# Patient Record
Sex: MALE | Race: WHITE | NOT HISPANIC OR LATINO | Employment: FULL TIME | URBAN - METROPOLITAN AREA
[De-identification: names, ages, dates, MRNs, and addresses within clinical notes are randomized per-mention and may not be internally consistent; named-entity substitution may affect disease eponyms.]

---

## 2020-09-23 ENCOUNTER — HOSPITAL ENCOUNTER (EMERGENCY)
Facility: OTHER | Age: 40
Discharge: HOME | End: 2020-09-23
Attending: EMERGENCY MEDICINE
Payer: COMMERCIAL

## 2020-09-23 ENCOUNTER — ANESTHESIA (OUTPATIENT)
Dept: ENDOSCOPY | Facility: OTHER | Age: 40
End: 2020-09-23

## 2020-09-23 ENCOUNTER — ANESTHESIA EVENT (OUTPATIENT)
Dept: ENDOSCOPY | Facility: OTHER | Age: 40
End: 2020-09-23

## 2020-09-23 VITALS
DIASTOLIC BLOOD PRESSURE: 59 MMHG | OXYGEN SATURATION: 100 % | SYSTOLIC BLOOD PRESSURE: 103 MMHG | RESPIRATION RATE: 18 BRPM | HEIGHT: 72 IN | BODY MASS INDEX: 31.83 KG/M2 | TEMPERATURE: 98 F | WEIGHT: 235 LBS | HEART RATE: 70 BPM

## 2020-09-23 DIAGNOSIS — T18.108A FOREIGN BODY IN ESOPHAGUS, INITIAL ENCOUNTER: Primary | ICD-10-CM

## 2020-09-23 DIAGNOSIS — K20.90 ESOPHAGITIS: ICD-10-CM

## 2020-09-23 LAB
CTP QC/QA: YES
SARS-COV-2 RDRP RESP QL NAA+PROBE: NEGATIVE

## 2020-09-23 PROCEDURE — 43239 EGD BIOPSY SINGLE/MULTIPLE: CPT | Performed by: INTERNAL MEDICINE

## 2020-09-23 PROCEDURE — 88305 TISSUE EXAM BY PATHOLOGIST: ICD-10-PCS | Mod: 26,,, | Performed by: PATHOLOGY

## 2020-09-23 PROCEDURE — 63600175 PHARM REV CODE 636 W HCPCS: Performed by: NURSE ANESTHETIST, CERTIFIED REGISTERED

## 2020-09-23 PROCEDURE — 88305 TISSUE EXAM BY PATHOLOGIST: CPT | Mod: 26,,, | Performed by: PATHOLOGY

## 2020-09-23 PROCEDURE — 37000008 HC ANESTHESIA 1ST 15 MINUTES: Performed by: INTERNAL MEDICINE

## 2020-09-23 PROCEDURE — 43247 EGD REMOVE FOREIGN BODY: CPT | Performed by: INTERNAL MEDICINE

## 2020-09-23 PROCEDURE — 37000009 HC ANESTHESIA EA ADD 15 MINS: Performed by: INTERNAL MEDICINE

## 2020-09-23 PROCEDURE — U0002 COVID-19 LAB TEST NON-CDC: HCPCS | Performed by: EMERGENCY MEDICINE

## 2020-09-23 PROCEDURE — 88305 TISSUE EXAM BY PATHOLOGIST: CPT | Performed by: PATHOLOGY

## 2020-09-23 PROCEDURE — 25000003 PHARM REV CODE 250: Performed by: NURSE ANESTHETIST, CERTIFIED REGISTERED

## 2020-09-23 PROCEDURE — 99291 CRITICAL CARE FIRST HOUR: CPT

## 2020-09-23 RX ORDER — ONDANSETRON 2 MG/ML
INJECTION INTRAMUSCULAR; INTRAVENOUS
Status: DISCONTINUED | OUTPATIENT
Start: 2020-09-23 | End: 2020-09-23

## 2020-09-23 RX ORDER — LIDOCAINE HCL/PF 100 MG/5ML
SYRINGE (ML) INTRAVENOUS
Status: DISCONTINUED | OUTPATIENT
Start: 2020-09-23 | End: 2020-09-23

## 2020-09-23 RX ORDER — PROPOFOL 10 MG/ML
VIAL (ML) INTRAVENOUS
Status: DISCONTINUED | OUTPATIENT
Start: 2020-09-23 | End: 2020-09-23

## 2020-09-23 RX ORDER — HYDROMORPHONE HYDROCHLORIDE 2 MG/ML
0.4 INJECTION, SOLUTION INTRAMUSCULAR; INTRAVENOUS; SUBCUTANEOUS EVERY 5 MIN PRN
Status: DISCONTINUED | OUTPATIENT
Start: 2020-09-23 | End: 2020-09-23 | Stop reason: HOSPADM

## 2020-09-23 RX ORDER — MEPERIDINE HYDROCHLORIDE 25 MG/ML
12.5 INJECTION INTRAMUSCULAR; INTRAVENOUS; SUBCUTANEOUS ONCE AS NEEDED
Status: DISCONTINUED | OUTPATIENT
Start: 2020-09-23 | End: 2020-09-23 | Stop reason: HOSPADM

## 2020-09-23 RX ORDER — SODIUM CHLORIDE 0.9 % (FLUSH) 0.9 %
3 SYRINGE (ML) INJECTION
Status: DISCONTINUED | OUTPATIENT
Start: 2020-09-23 | End: 2020-09-23 | Stop reason: HOSPADM

## 2020-09-23 RX ORDER — NEOSTIGMINE METHYLSULFATE 1 MG/ML
INJECTION, SOLUTION INTRAVENOUS
Status: DISCONTINUED | OUTPATIENT
Start: 2020-09-23 | End: 2020-09-23

## 2020-09-23 RX ORDER — PANTOPRAZOLE SODIUM 40 MG/1
40 TABLET, DELAYED RELEASE ORAL 2 TIMES DAILY
Qty: 60 TABLET | Refills: 0 | Status: SHIPPED | OUTPATIENT
Start: 2020-09-23 | End: 2021-09-23

## 2020-09-23 RX ORDER — ROCURONIUM BROMIDE 10 MG/ML
INJECTION, SOLUTION INTRAVENOUS
Status: DISCONTINUED | OUTPATIENT
Start: 2020-09-23 | End: 2020-09-23

## 2020-09-23 RX ORDER — FENTANYL CITRATE 50 UG/ML
INJECTION, SOLUTION INTRAMUSCULAR; INTRAVENOUS
Status: DISCONTINUED | OUTPATIENT
Start: 2020-09-23 | End: 2020-09-23

## 2020-09-23 RX ORDER — ONDANSETRON 2 MG/ML
4 INJECTION INTRAMUSCULAR; INTRAVENOUS DAILY PRN
Status: DISCONTINUED | OUTPATIENT
Start: 2020-09-23 | End: 2020-09-23 | Stop reason: HOSPADM

## 2020-09-23 RX ORDER — SODIUM CHLORIDE, SODIUM LACTATE, POTASSIUM CHLORIDE, CALCIUM CHLORIDE 600; 310; 30; 20 MG/100ML; MG/100ML; MG/100ML; MG/100ML
INJECTION, SOLUTION INTRAVENOUS CONTINUOUS PRN
Status: DISCONTINUED | OUTPATIENT
Start: 2020-09-23 | End: 2020-09-23

## 2020-09-23 RX ORDER — SUCCINYLCHOLINE CHLORIDE 20 MG/ML
INJECTION INTRAMUSCULAR; INTRAVENOUS
Status: DISCONTINUED | OUTPATIENT
Start: 2020-09-23 | End: 2020-09-23

## 2020-09-23 RX ORDER — OXYCODONE HYDROCHLORIDE 5 MG/1
5 TABLET ORAL
Status: DISCONTINUED | OUTPATIENT
Start: 2020-09-23 | End: 2020-09-23 | Stop reason: HOSPADM

## 2020-09-23 RX ADMIN — NEOSTIGMINE METHYLSULFATE 5 MG: 1 INJECTION INTRAVENOUS at 06:09

## 2020-09-23 RX ADMIN — SUCCINYLCHOLINE CHLORIDE 160 MG: 20 INJECTION, SOLUTION INTRAMUSCULAR; INTRAVENOUS at 06:09

## 2020-09-23 RX ADMIN — FENTANYL CITRATE 100 MCG: 50 INJECTION, SOLUTION INTRAMUSCULAR; INTRAVENOUS at 06:09

## 2020-09-23 RX ADMIN — ONDANSETRON HYDROCHLORIDE 4 MG: 2 INJECTION INTRAMUSCULAR; INTRAVENOUS at 06:09

## 2020-09-23 RX ADMIN — GLYCOPYRROLATE 0.6 MG: 0.2 INJECTION, SOLUTION INTRAMUSCULAR; INTRAVITREAL at 06:09

## 2020-09-23 RX ADMIN — LIDOCAINE HYDROCHLORIDE 100 MG: 20 INJECTION, SOLUTION INTRAVENOUS at 06:09

## 2020-09-23 RX ADMIN — SODIUM CHLORIDE, SODIUM LACTATE, POTASSIUM CHLORIDE, AND CALCIUM CHLORIDE: 600; 310; 30; 20 INJECTION, SOLUTION INTRAVENOUS at 06:09

## 2020-09-23 RX ADMIN — PROPOFOL 200 MG: 10 INJECTION, EMULSION INTRAVENOUS at 06:09

## 2020-09-23 RX ADMIN — CARBOXYMETHYLCELLULOSE SODIUM 2 DROP: 2.5 SOLUTION/ DROPS OPHTHALMIC at 06:09

## 2020-09-23 RX ADMIN — ROCURONIUM BROMIDE 20 MG: 10 INJECTION, SOLUTION INTRAVENOUS at 06:09

## 2020-09-23 NOTE — Clinical Note
"Vince ROE" Homero was seen and treated in our emergency department on 9/23/2020.  He may return to work on 09/25/2020.       If you have any questions or concerns, please don't hesitate to call.      Alton Solis RN    "

## 2020-09-23 NOTE — DISCHARGE INSTRUCTIONS
Call your primary care doctor to make the first available appointment.     Keep all your medical appointments.     Take your regular medication as prescribed. Contact your primary care provider before running out of medication, or for any problems obtaining them.    Do not drive or operate heavy machinery while taking opioid or muscle relaxing medications. Examples include norco, percocet, xanax, valium, flexeril.     Overuse or long term use of pain and sedating medication may lead to addiction, dependence, organ failure, family and work problems, legal problems, accidental overdose and death.    If you do not have health insurance, you probably can afford it:  Call 1-531.372.1753 ([facility] hotline) or go to www.[place].gov    Your evaluation in the ED does not suggest any emergent or life threatening medical condition requiring admission or immediate intervention beyond that provided in the ED.   However, the signs of a serious problem sometimes take more time to appear.   RETURN TO THE ER if any of the following occur:    Weakness, dizziness, fainting, or loss of consciousness   Fever of 100.4ºF (38ºC) or higher  Any worse symptoms  Any new or concerning symptoms    To protect yourself and others from COVID19:  Minimize trips outside of home.  Wear a mask whenever outside your home.   Wear a mask whenever indoors with people you do not live with.  Stay at least 6 feet from others you do not live with (inside or outside).  Wash hands frequently for at least 20 seconds at a time.  Avoid crowds or crowded places.  Quarantine for 14 days if you are exposed to someone with cold, flu, or COVID19 symptoms.   Even if you or they had a negative COVID19 test   Even if you have no symptoms   Quarantine for 14 days if you have cold, flu, or COVID19 symptoms (fever, cough, sore throat, breathing troubles, loss of taste/smell, stomach symptoms, etc.)  Disinfect surfaces that are touched a lot (includes your phone, handles,  switches, etc)      Free COVID19 testing:   No cost. No health insurance required. Walk-up.   [facility] Mobile Testing: Anyone is eligible. No ID required. Most days. Hours and locations vary. Call 211 or go to ready.[place].gov.   [facility]: Must have symptoms (fever, cough, sore throat, breathing troubles, loss of taste/smell, stomach symptoms, etc.). [address], [phone], [email or web address]. Mon-Fri 830am-4pm. Sat 830am-11am.     Food Assistance:  [place] COVID-19 Meal Assistance Program: The [facility] will provide meal assistance to any resident 65 or older, adults with high-risk conditions, homeless residents, people who test positive for COVID-19, people recently exposed to COVID19, and children under 18. Call 311 (490-463-8708) or go to [place].gov/311    [employer]:   Why Hunger Hotline (LEIGH/EN): text your zip code to receive a list of food pantries near you: [phone]  [employer] [place]: Saturdays 9am-12pm at [address].  No ID or paperwork required.  [employer] [place]: Locations vary weekly. Call 211 for information about second harvest or visit www.[place].gov. No ID or paperwork required. Your windows do not need to be opened. [employer] may be present.     Food Delivery:  [employer] (EN/LEIGH):  Delivering on Wednesdays.  Call 246-832-0097 or text 094-057-1950  [employer]: (LEIGH/EN): Delivering groceries and supplies to immigrant families Monday through Friday 9am-4pm. [phone].  [place] [employer] (EN): Delivering groceries for those 60+ and/or immunocompromised. 535.769.6308 or yamutualaid@[employer].Metrilus    Legal Services and Rent Assistance:  [facility] Legal Service: Free legal services for low income individuals. Includes problems with rent or eviction. 405.379.9932  [facility] [employer]: For problems with rent, eviction. Call/text 650-330-1347, or email chanel@jpnsi.org  [facility]  Justice Center: Guidance for those experiencing family violence, child abuse, and sexual assault. 119.354.3250    Unemployment:  Those who have lost all or some of their work are eligible for state unemployment ($247 weekly before tax). They may also be eligible for federal Pandemic Unemployment Assistance ($600 weekly before tax). This includes independent contractors, gig workers, and those unemployed pre-COVID.   [place] Unemployment Hotline: Mon-Fri 830am-430pm. 287.416.6004, 761.323.7379, and 672-025-9642. Due to long phone hold times, we recommend applying online at www.[employer]EX[employer]. If you need help with internet access for the application, call [phone].

## 2020-09-23 NOTE — ANESTHESIA POSTPROCEDURE EVALUATION
Anesthesia Post Evaluation    Patient: Vince Valentin    Procedure(s) Performed: Procedure(s) (LRB):  EGD (ESOPHAGOGASTRODUODENOSCOPY) (N/A)    Final Anesthesia Type: general    Patient location during evaluation: PACU  Patient participation: Yes- Able to Participate  Level of consciousness: awake and alert  Post-procedure vital signs: reviewed and stable  Pain management: adequate  Airway patency: patent    PONV status at discharge: No PONV  Anesthetic complications: no      Cardiovascular status: blood pressure returned to baseline  Respiratory status: unassisted and room air  Hydration status: euvolemic  Follow-up not needed.          Vitals Value Taken Time   /75 09/23/20 0812   Temp 36.3 °C (97.4 °F) 09/23/20 0730   Pulse 73 09/23/20 0826   Resp 18 09/23/20 0730   SpO2 93 % 09/23/20 0826   Vitals shown include unvalidated device data.      Event Time   Out of Recovery 07:46:00         Pain/Alissa Score: Alissa Score: 10 (9/23/2020  7:30 AM)

## 2020-09-23 NOTE — ED NOTES
Pt returned from endo. Pt AAOx4. Pt speaking in clear, complete sentences. Pt able to hold an upright position. Pt has no difficulty swallowing. Pt able to swallow water without any difficulty. Pt denies CP, SOB, n/v/d, fever, chills. RR even, unlabored. NAD noted. Pt attached to pulse ox and BP cycled. Will continue to monitor.

## 2020-09-23 NOTE — PROVATION PATIENT INSTRUCTIONS
Discharge Summary/Instructions after an Endoscopic Procedure  Patient Name: Vince Valentin  Patient MRN: [id number]  Patient YOB: 1980 Wednesday, September 23, 2020  Meghna Gore MD  RESTRICTIONS:  During your procedure today, you received medications for sedation.  These   medications may affect your judgment, balance and coordination.  Therefore,   for 24 hours, you have the following restrictions:   - DO NOT drive a car, operate machinery, make legal/financial decisions,   sign important papers or drink alcohol.    ACTIVITY:  Today: no heavy lifting, straining or running due to procedural   sedation/anesthesia.  The following day: return to full activity including work.  DIET:  Eat and drink normally unless instructed otherwise.     TREATMENT FOR COMMON SIDE EFFECTS:  - Mild abdominal pain, nausea, belching, bloating or excessive gas:  rest,   eat lightly and use a heating pad.  - Sore Throat: treat with throat lozenges and/or gargle with warm salt   water.  - Because air was used during the procedure, expelling large amounts of air   from your rectum or belching is normal.  - If a bowel prep was taken, you may not have a bowel movement for 1-3 days.    This is normal.  SYMPTOMS TO WATCH FOR AND REPORT TO YOUR PHYSICIAN:  1. Abdominal pain or bloating, other than gas cramps.  2. Chest pain.  3. Back pain.  4. Signs of infection such as: chills or fever occurring within 24 hours   after the procedure.  5. Rectal bleeding, which would show as bright red, maroon, or black stools.   (A tablespoon of blood from the rectum is not serious, especially if   hemorrhoids are present.)  6. Vomiting.  7. Weakness or dizziness.  GO DIRECTLY TO THE NEAREST EMERGENCY ROOM IF YOU HAVE ANY OF THE FOLLOWING:      Difficulty breathing              Chills and/or fever over 101 F   Persistent vomiting and/or vomiting blood   Severe abdominal pain   Severe chest pain   Black, tarry stools   Bleeding- more than one  tablespoon   Any other symptom or condition that you feel may need urgent attention  Your doctor recommends these additional instructions:  If any biopsies were taken, your doctors clinic will contact you in 1 to 2   weeks with any results.  - Patient has a contact number available for emergencies.  The signs and   symptoms of potential delayed complications were discussed with the   patient.  Return to normal activities tomorrow.  Written discharge   instructions were provided to the patient.   - Soft diet for 10 days.   - Post procedure medication orders were given.   - Await pathology results.   - Use Protonix (pantoprazole) 40 mg PO BID.   - Discharge patient to home.  For questions, problems or results please call your physician - Meghna Gore MD at Work:  [phone].  [facility], EMERGENCY ROOM PHONE NUMBER: [phone], [facility]   [phone].  IF A COMPLICATION OR EMERGENCY SITUATION ARISES AND YOU ARE UNABLE TO REACH   YOUR PHYSICIAN - GO DIRECTLY TO THE EMERGENCY ROOM.  MD Meghna Bhat MD  9/23/2020 7:00:37 AM  This report has been verified and signed electronically.  [employer]ATION

## 2020-09-23 NOTE — CONSULTS
Reason for Consult:  Food bolus impaction    HPI:   Pt is a 40 y.o. male  with a h/o peptic stricture that was dilated several years ago. He didn't take PPI as instructed. He reports intermittent dysphagia that sometimes requires regurgitation. This is his first food bolus. No anticoagulation. No excessive ETOH    No past medical history on file.    No past surgical history on file.    No family history on file.    Social History     Socioeconomic History    Marital status: Single     Spouse name: Not on file    Number of children: Not on file    Years of education: Not on file    Highest education level: Not on file   Occupational History    Not on file   Social Needs    Financial resource strain: Not on file    Food insecurity     Worry: Not on file     Inability: Not on file    Transportation needs     Medical: Not on file     Non-medical: Not on file   Tobacco Use    Smoking status: Not on file   Substance and Sexual Activity    Alcohol use: Not on file    Drug use: Not on file    Sexual activity: Not on file   Lifestyle    Physical activity     Days per week: Not on file     Minutes per session: Not on file    Stress: Not on file   Relationships    Social connections     Talks on phone: Not on file     Gets together: Not on file     Attends Mandaeism service: Not on file     Active member of club or organization: Not on file     Attends meetings of clubs or organizations: Not on file     Relationship status: Not on file   Other Topics Concern    Not on file   Social History Narrative    Not on file       Endoscopic History:  As above    Review of patient's allergies indicates:  No Known Allergies    No current facility-administered medications on file prior to encounter.      No current outpatient medications on file prior to encounter.     Scheduled Meds:  Continuous Infusions:  PRN Meds:.    Review of Systems:  CONSTITUTIONAL: Negative for weakness, fever, weight loss, weight gain.  HEENT:  Eyes: Negative for double/blurred vision. Ears: Negative pain or loss of hearing. Nose:Negative for nasal congestion. Negative for rhinorrhea Mouth: Negative for dry mouth/pain Throat: Negative for masses or hoarseness.  CARDIOVASCULAR: Negative for chest pain or palpitations.  RESPIRATORY: Negative for SOB, wheezes  GASTROINTESTINAL: See HPI  GENITOURINARY: Negative for dysuria/hematuria.  MUSCULOSKELETAL: Negative for osteoarthritis, muscle pain.  SKIN: Negative for rashes/lesions.  NEUROLOGIC: Negative for headaches, numbness/tingling.  ENDOCRINE: Negative for diabetes or thyroid abnormalities.  HEMATOLOGIC: Negative for anemia or blood dyscrasias.    Patient Vitals for the past 24 hrs:   BP Temp Pulse Resp SpO2 Height Weight   09/23/20 0518 (!) 147/86 -- 74 -- 100 % -- --   09/23/20 0515 -- 97.9 °F (36.6 °C) -- -- -- -- --   09/23/20 0454 119/88 -- 82 18 98 % 6' (1.829 m) 106.6 kg (235 lb)       Gen: Well developed, well nourished,mild distress, can't handle secretions,cooperative  HEENT: Anicteric, PERRLA, normocephalic, neck symmetrical  CV: S1, S2, no murmers/rubs, non-displaced PMI  Lungs: CTA-B, normal excursion  Abd: Soft, NT, ND, normal BS's, no HSM  Ext: No c/c/e, 1+ DP pulses to BLE's  Neuro: CN II-XII grossly intact, no asterixis.  Skin: No rashes/lesions, normal texture  Psych: AA&O x 4, normal affect    Labs:  No results for input(s): GLUCOSE, CALCIUM, PROT, NA, K, CO2, CL, BUN, CREATININE, ALKPHOS, ALT, AST, BILITOT in the last 24 hours.    Invalid input(s):  ALBUMIN  No results found for this or any previous visit (from the past 336 hour(s)).  No results for input(s): PT, INR, APTT in the last 24 hours.      Assessment:  Pt. Is a 40 y.o. male with food bolus impaction    Recommendations:  Emergent EGD    I would like to take this opportunity to thank you for this consult.  If you have any questions or concerns, please do not hesitate to contact me.

## 2020-09-23 NOTE — TRANSFER OF CARE
Anesthesia Transfer of Care Note    Patient: Vince Valentin    Procedure(s) Performed: Procedure(s) (LRB):  EGD (ESOPHAGOGASTRODUODENOSCOPY) (N/A)    Patient location: PACU    Anesthesia Type: epidural    Transport from OR: Transported from OR on 2-3 L/min O2 by NC with adequate spontaneous ventilation    Post pain: adequate analgesia    Post assessment: no apparent anesthetic complications    Post vital signs: stable    Level of consciousness: awake    Complications: none    Transfer of care protocol was followed      Last vitals:   Visit Vitals  BP (!) 146/84 (BP Location: Right arm, Patient Position: Lying)   Pulse 77   Temp 36.2 °C (97.2 °F) (Skin)   Resp 18   Ht 6' (1.829 m)   Wt 106.6 kg (235 lb)   SpO2 98%   BMI 31.87 kg/m²

## 2020-09-23 NOTE — ANESTHESIA PREPROCEDURE EVALUATION
09/23/2020  Vince Valentin is a 40 y.o., male.    Anesthesia Evaluation    I have reviewed the Patient Summary Reports.    I have reviewed the Nursing Notes. I have reviewed the NPO Status.      Review of Systems  Anesthesia Hx:  No problems with previous Anesthesia    Social:  Smoker    Cardiovascular:   Exercise tolerance: good    Pulmonary:  Pulmonary Normal    Hepatic/GI:  Hepatic/GI Normal    Endocrine:  Endocrine Normal        Physical Exam  General:  Well nourished    Airway/Jaw/Neck:  Airway Findings: Mouth Opening: Normal Tongue: Normal  General Airway Assessment: Adult  Mallampati: II  TM Distance: Normal, at least 6 cm  Jaw/Neck Findings:     Neck ROM: Normal ROM      Dental:  Dental Findings: In tact             Anesthesia Plan  Type of Anesthesia, risks & benefits discussed:  Anesthesia Type:  general  Patient's Preference:   Intra-op Monitoring Plan:   Intra-op Monitoring Plan Comments:   Post Op Pain Control Plan:   Post Op Pain Control Plan Comments:   Induction:   IV  Beta Blocker:         Informed Consent: Patient understands risks and agrees with Anesthesia plan.  Questions answered. Anesthesia consent signed with patient.  ASA Score: 2  emergent   Day of Surgery Review of History & Physical:    H&P update referred to the surgeon.         Ready For Surgery From Anesthesia Perspective.

## 2020-09-23 NOTE — PROVIDER PROGRESS NOTES - EMERGENCY DEPT.
Encounter Date: 9/23/2020    ED Physician Progress Notes        Physician Note:   40-year-old male transferred here for evaluation of esophageal food bolus, unable to tolerate liquids or his secretions.  He has history of esophageal stricture in the past.  Patient signed out by Dr. Henderson pending EGD and disposition.  GI took patient to for EGD this morning and removed food bolus, but found no stricture.  It did show some esophagitis that was biopsied, which may be responsible patient's symptoms of slow transit in his distal esophagus.  Since return from EGD, patient now back to normal mental status baseline and tolerating liquids without difficulty. He is already on Protonix 40 mg daily, and was advised to increase it to b.i.d., and I also counseled him on diet modification for esophagitis.  He is having someone come pick him up, and was advised on post sedation precautions and to slowly advance diet and avoid foods with large pieces.  He is comfortable discharge plan and will follow-up with GI as outpatient.

## 2020-09-23 NOTE — PLAN OF CARE
Please see Provation for details  Food bolus at GE junction removed with Tracy net and rat tooth forceps. No stricture seen, but esophagitis in distal esophagus and features consistent with Eosinophilic esophagitis. Proximal esophageal bx taken.  Rec:  PPI bid  Soft diet  Call in a week for bx results

## 2020-09-23 NOTE — ANESTHESIA PROCEDURE NOTES
Intubation  Performed by: Poly Post CRNA  Authorized by: Dustin Russ MD     Intubation:     Induction:  Rapid sequence induction    Intubated:  Postinduction    Mask Ventilation:  N/a    Attempts:  1    Attempted By:  CRNA    Method of Intubation:  Video laryngoscopy    Blade:  Zapien 3    Laryngeal View Grade: Grade I - full view of chords      Difficult Airway Encountered?: No      Complications:  None    Airway Device:  Oral endotracheal tube    Airway Device Size:  7.5    Style/Cuff Inflation:  Cuffed    Inflation Amount (mL):  4    Tube secured:  23    Secured at:  The lips    Placement Verified By:  Capnometry    Complicating Factors:  None    Findings Post-Intubation:  BS equal bilateral

## 2020-09-23 NOTE — ED PROVIDER NOTES
Encounter Date: 9/23/2020       History     Chief Complaint   Patient presents with    Swallowed Foreign Body     transfer for esophageal stricture     HPI   History provided by the patient, medical records, EMS.  Patient presents with sensation of impacted food bolus at his epigastrium, and inability to tolerate any oral intake including his saliva since approximately 1630 hr when he was eating a piece of turkey.  This is a recurrent problem.  He has a remote history of esophageal dilation.  Describes having intermittent transient sensation of food getting stuck in his esophagus for at least the past year.  He has seen a GI in the [facility] who recommended repeat dilation, but has not scheduled it yet.   Symptoms not improved with Demerol, Ativan, glucagon given at the transferring facility.      Review of patient's allergies indicates:  No Known Allergies  History reviewed. No pertinent past medical history.  History reviewed. No pertinent surgical history.  History reviewed. No pertinent family history.  Social History     Tobacco Use    Smoking status: Former Smoker   Substance Use Topics    Alcohol use: Yes     Comment: ocassionally    Drug use: Not on file     Review of Systems  ROS: As per HPI and below:   General: No fever.   HENT: No facial pain.   Eyes: Negative for eye pain.   Cardiovascular: No chest pain.   Respiratory:  No dyspnea.   GI: No abdominal discomfort. No nausea. Notes vomiting. No diarrhea.   Skin: No rashes.   Neuro:  No syncope.  No focal deficits.   Musculoskeletal: No extremity pain.  All other systems reviewed and are negative.    Physical Exam     Initial Vitals   BP Pulse Resp Temp SpO2   09/23/20 0454 09/23/20 0454 09/23/20 0454 09/23/20 0515 09/23/20 0454   119/88 82 18 97.9 °F (36.6 °C) 98 %      MAP       --                Physical Exam  Nursing note and vitals reviewed.  BP (!) 103/59   Pulse 70   Temp 98.1 °F (36.7 °C)   Resp 18   Ht 6' (1.829 m)   Wt 106.6 kg  (235 lb)   SpO2 100%   BMI 31.87 kg/m²   Constitutional: AAOx3. No distress.  Spitting into an emesis bag.  Vomited immediately after small sip of water.  Eyes: EOMI. No discharge. Anicteric.  HENT:   Mouth/Throat: Oropharynx is clear. Uvula midline. Mucus membranes moist.  Neck: Normal range of motion. Neck supple.  Cardiovascular: Normal rate. No murmur, no gallop and no friction rub heard.   Pulmonary/Chest: No respiratory distress. Effort normal. No wheezes, no rales, no rhonchi.   Abdominal: Bowel sounds normal. Soft. No distension and no mass. There is no tenderness. There is no rebound, no guarding, no tenderness at McBurney's point.  Musculoskeletal: Normal range of motion.   Neurological: GCS 15. Alert and oriented to person, place, and time. No gross cranial nerve, light touch or strength deficit. Coordination normal.   Skin: Skin is warm and dry.   EXT: 2+ radial pulses.   Psychiatric: Behavior is normal. Judgment normal.    ED Course   Procedures  Labs Reviewed   SARS-COV-2 RDRP GENE          Imaging Results    None                            ED Course as of Sep 23 0902   Wed Sep 23, 2020   0505 Patient is a 40-year-old male with remote history of esophageal stricture status post dilation who presents with sensation of impacted food bolus and unable to tolerate any oral intake including his saliva since approximately 1630 hrs after eating a piece of turkey.  He states he has been having transient sensation of food being stuck for about a year.  He had no improvement with ativan, Demerol, glucagon.     [RC]   0512 Patient history, findings, results discussed with GI Dr. Gore who agrees patient requires emergent endoscopy.      =====================================  Critical Care:  30 minutes total critical care time was personally spent by me, exclusive of procedures and separately billable time.   Critical care was necessary to treat or prevent imminent or life-threatening deterioration of the  following conditions: GI obstruction   =====================================        [RC]   0610 I discussed the patient history, findings, plan with Dr. Christensen, who assumes care.        [RC]      ED Course User Index  [RC] Andrei Henderson MD            Clinical Impression:       ICD-10-CM ICD-9-CM   1. Foreign body in esophagus, initial encounter  T18.108A 935.1     E915   2. Esophagitis  K20.9 530.10                          ED Disposition Condition    Discharge Stable        ED Prescriptions     Medication Sig Dispense Start Date End Date Auth. Provider    pantoprazole (PROTONIX) 40 MG tablet Take 1 tablet (40 mg total) by mouth 2 (two) times daily. 60 tablet 9/23/2020 9/23/2021 Kale Christensen MD        Follow-up Information     Follow up With Specialties Details Why Contact Info    Meghna Gore MD Gastroenterology  For recheck with specialist [address]  [address] [address]  [phone]                                         Andrei Henderson MD  09/23/20 0902

## 2020-09-28 LAB
FINAL PATHOLOGIC DIAGNOSIS: NORMAL
GROSS: NORMAL